# Patient Record
Sex: FEMALE | Race: WHITE | URBAN - METROPOLITAN AREA
[De-identification: names, ages, dates, MRNs, and addresses within clinical notes are randomized per-mention and may not be internally consistent; named-entity substitution may affect disease eponyms.]

---

## 2023-07-19 ENCOUNTER — OFFICE VISIT (OUTPATIENT)
Facility: LOCATION | Age: 33
End: 2023-07-19
Payer: COMMERCIAL

## 2023-07-19 DIAGNOSIS — H47.10 PAPILLEDEMA: Primary | ICD-10-CM

## 2023-07-19 DIAGNOSIS — H40.013 OPEN ANGLE WITH BORDERLINE FINDINGS, LOW RISK, BILATERAL: ICD-10-CM

## 2023-07-19 PROCEDURE — 76514 ECHO EXAM OF EYE THICKNESS: CPT | Performed by: STUDENT IN AN ORGANIZED HEALTH CARE EDUCATION/TRAINING PROGRAM

## 2023-07-19 PROCEDURE — 99204 OFFICE O/P NEW MOD 45 MIN: CPT | Performed by: STUDENT IN AN ORGANIZED HEALTH CARE EDUCATION/TRAINING PROGRAM

## 2023-07-19 PROCEDURE — 92133 CPTRZD OPH DX IMG PST SGM ON: CPT | Performed by: STUDENT IN AN ORGANIZED HEALTH CARE EDUCATION/TRAINING PROGRAM

## 2023-07-19 ASSESSMENT — INTRAOCULAR PRESSURE
OS: 15
OD: 13

## 2023-07-19 NOTE — IMPRESSION/PLAN
Impression: Patient presents today for evaluation of optic nerve swelling, referred by Dr. Leland Rizzo. POHx: Amblyopia OU, head trauma, concussion when she was 16, (-) lasers surgeries FOHx: glaucoma paternal and maternal grandmothers PMHx: migraines, childhood diabetes, bi-polar disorder taking Abilify. SocialHx: (-)smoking. Eye medications: none Allergies: NKDA Plan: Testing:
OCT/ONH 07/2023: elevated N, thin S/T, I/T OU, GCC WNL. HVF 30-2 07/2023: OU nonspecific depression. Pachy: 551/557. Today: 	
color vision 13/13 OU
EOM full, ortho OU
OCT/ONH PACHY, HVF30-2 OU performed and reviewed today. Patient reports she has had chronic history of transient blurry vision, transient diplopia, intractable headaches, and pulsatile tinnitus. On examination today, her optic nerves look consistent with papilledema although I cannot rule out pseudopapilledema from my examination today. Dilated exam and testing revealed concerns for papilledema. Optic nerve elevated N, blurred margins OD < OS optic nerve elevated 360, blurred margins N. Will refer to emergency room and neuropthamology. Patient expressed understanding. Plan:
Referred to emergency room ASAP for MRI brain and orbits w an w/out contrast, MRV, also recommend lumbar puncture opening pressure, CSF studies cell count, glucose and protein. Testing NMO and anti-mog. Referred to neuropthamology Dr. Susie Raphael. RTC 1 week for follow up.

## 2023-07-26 ENCOUNTER — OFFICE VISIT (OUTPATIENT)
Facility: LOCATION | Age: 33
End: 2023-07-26
Payer: COMMERCIAL

## 2023-07-26 DIAGNOSIS — H47.10 PAPILLEDEMA: Primary | ICD-10-CM

## 2023-07-26 DIAGNOSIS — G93.2 BENIGN INTRACRANIAL HYPERTENSION: ICD-10-CM

## 2023-07-26 PROCEDURE — 99214 OFFICE O/P EST MOD 30 MIN: CPT | Performed by: STUDENT IN AN ORGANIZED HEALTH CARE EDUCATION/TRAINING PROGRAM

## 2023-07-26 ASSESSMENT — INTRAOCULAR PRESSURE
OD: 10
OS: 10

## 2023-08-16 ENCOUNTER — OFFICE VISIT (OUTPATIENT)
Facility: LOCATION | Age: 33
End: 2023-08-16
Payer: COMMERCIAL

## 2023-08-16 DIAGNOSIS — H47.10 UNSPECIFIED PAPILLEDEMA: ICD-10-CM

## 2023-08-16 DIAGNOSIS — G93.2 BENIGN INTRACRANIAL HYPERTENSION: Primary | ICD-10-CM

## 2023-08-16 PROCEDURE — 92133 CPTRZD OPH DX IMG PST SGM ON: CPT | Performed by: STUDENT IN AN ORGANIZED HEALTH CARE EDUCATION/TRAINING PROGRAM

## 2023-08-16 PROCEDURE — 99214 OFFICE O/P EST MOD 30 MIN: CPT | Performed by: STUDENT IN AN ORGANIZED HEALTH CARE EDUCATION/TRAINING PROGRAM

## 2023-08-16 PROCEDURE — 92083 EXTENDED VISUAL FIELD XM: CPT | Performed by: STUDENT IN AN ORGANIZED HEALTH CARE EDUCATION/TRAINING PROGRAM

## 2023-08-16 RX ORDER — ONDANSETRON 4 MG/1
4 MG TABLET, FILM COATED ORAL
Qty: 30 | Refills: 0 | Status: ACTIVE
Start: 2023-08-16

## 2023-08-16 ASSESSMENT — INTRAOCULAR PRESSURE
OS: 13
OD: 12

## 2023-10-24 ENCOUNTER — OFFICE VISIT (OUTPATIENT)
Facility: LOCATION | Age: 33
End: 2023-10-24
Payer: COMMERCIAL

## 2023-10-24 DIAGNOSIS — G93.2 BENIGN INTRACRANIAL HYPERTENSION: Primary | ICD-10-CM

## 2023-10-24 PROCEDURE — 92133 CPTRZD OPH DX IMG PST SGM ON: CPT | Performed by: STUDENT IN AN ORGANIZED HEALTH CARE EDUCATION/TRAINING PROGRAM

## 2023-10-24 PROCEDURE — 99214 OFFICE O/P EST MOD 30 MIN: CPT | Performed by: STUDENT IN AN ORGANIZED HEALTH CARE EDUCATION/TRAINING PROGRAM

## 2023-10-24 PROCEDURE — 92083 EXTENDED VISUAL FIELD XM: CPT | Performed by: STUDENT IN AN ORGANIZED HEALTH CARE EDUCATION/TRAINING PROGRAM

## 2023-10-24 ASSESSMENT — INTRAOCULAR PRESSURE
OD: 16
OS: 13